# Patient Record
Sex: MALE | Race: WHITE | NOT HISPANIC OR LATINO | URBAN - METROPOLITAN AREA
[De-identification: names, ages, dates, MRNs, and addresses within clinical notes are randomized per-mention and may not be internally consistent; named-entity substitution may affect disease eponyms.]

---

## 2017-05-06 ENCOUNTER — INPATIENT (INPATIENT)
Facility: HOSPITAL | Age: 52
LOS: 0 days | Discharge: HOME | End: 2017-05-07

## 2017-06-28 DIAGNOSIS — R42 DIZZINESS AND GIDDINESS: ICD-10-CM

## 2017-06-28 DIAGNOSIS — R11.0 NAUSEA: ICD-10-CM

## 2017-06-28 DIAGNOSIS — R07.9 CHEST PAIN, UNSPECIFIED: ICD-10-CM

## 2017-06-28 DIAGNOSIS — R94.39 ABNORMAL RESULT OF OTHER CARDIOVASCULAR FUNCTION STUDY: ICD-10-CM

## 2020-02-09 ENCOUNTER — EMERGENCY (EMERGENCY)
Facility: HOSPITAL | Age: 55
LOS: 1 days | Discharge: HOME | End: 2020-02-09
Admitting: EMERGENCY MEDICINE
Payer: OTHER MISCELLANEOUS

## 2020-02-09 VITALS
RESPIRATION RATE: 18 BRPM | TEMPERATURE: 98 F | WEIGHT: 182.1 LBS | SYSTOLIC BLOOD PRESSURE: 165 MMHG | DIASTOLIC BLOOD PRESSURE: 102 MMHG | OXYGEN SATURATION: 95 % | HEART RATE: 65 BPM | HEIGHT: 67 IN

## 2020-02-09 PROCEDURE — 73090 X-RAY EXAM OF FOREARM: CPT | Mod: 26,RT

## 2020-02-09 PROCEDURE — 73110 X-RAY EXAM OF WRIST: CPT | Mod: 26,RT

## 2020-02-09 PROCEDURE — 99284 EMERGENCY DEPT VISIT MOD MDM: CPT | Mod: 25,57

## 2020-02-09 PROCEDURE — 25600 CLTX DST RDL FX/EPHYS SEP WO: CPT | Mod: 54

## 2020-02-09 NOTE — ED PROVIDER NOTE - MUSCULOSKELETAL, MLM
+TTP along distal, dorsal aspect of right forearm. no focal bony tenderness, obvious deformities or palpable crepitus. Painful ROM. Compartments soft. Distal NV status intact. +TTP along distal, radial aspect of right forearm and wrist, no obvious deformities or palpable crepitus. No snuffbox tenderness. Painful ROM. Compartments soft. Distal NV status intact.

## 2020-02-09 NOTE — ED PROVIDER NOTE - CLINICAL SUMMARY MEDICAL DECISION MAKING FREE TEXT BOX
Xrays reveal fracture of distal right radius. Pt placed in reverse sugar tong splint without complication. NV status intact. RICE and Ortho F/U in 1-3 days for further management. Strict return precautions reviewed with pt in which pt verbalizes understanding and agrees to.

## 2020-02-09 NOTE — ED PROVIDER NOTE - OBJECTIVE STATEMENT
55 y/o M presents to the ED c/o pain to his right wrist and forearm after tripping and falling onto his outstretched right hand while at work. [place] [employer] and [place] walking along the tracks when he tripped and fell forward. Pain is moderate in nature and is worse with movement of the right wrist and forearm. He has not taken any medication for the pain prior to arrival and states he does not want any pain medication at this time. No other associated injuries. no head strike. No numbness or weakness.

## 2020-02-09 NOTE — ED PROVIDER NOTE - CARE PROVIDER_API CALL
Kyrie Brown)  Orthopaedic Surgery  [address] [place] [address]  Phone: [phone]  Fax: [phone]  Follow Up Time: 1-3 Days

## 2020-02-09 NOTE — ED ADULT NURSE NOTE - CHIEF COMPLAINT QUOTE
pt. reports falling off his truck while working and landing on his right forearm. Pt. complaining of pain to arm and wrist, no deformity noted, small abrasion to the wrist. Denies hitting his head

## 2020-02-09 NOTE — ED ADULT TRIAGE NOTE - WEIGHT IN LBS
Preventive Care at the 11 - 14 Year Visit    Growth Percentiles & Measurements   Weight: 0 lbs 0 oz / Patient weight not available. / No weight on file for this encounter.  Length: Data Unavailable / 0 cm No height on file for this encounter.   BMI: There is no height or weight on file to calculate BMI. No height and weight on file for this encounter.     Next Visit    Continue to see your health care provider every year for preventive care.    Nutrition    It s very important to eat breakfast. This will help you make it through the morning.    Sit down with your family for a meal on a regular basis.    Eat healthy meals and snacks, including fruits and vegetables. Avoid salty and sugary snack foods.    Be sure to eat foods that are high in calcium and iron.    Avoid or limit caffeine (often found in soda pop).    Sleeping    Your body needs about 9 hours of sleep each night.    Keep screens (TV, computer, and video) out of the bedroom / sleeping area.  They can lead to poor sleep habits and increased obesity.    Health    Limit TV, computer and video time to one to two hours per day.    Set a goal to be physically fit.  Do some form of exercise every day.  It can be an active sport like skating, running, swimming, team sports, etc.    Try to get 30 to 60 minutes of exercise at least three times a week.    Make healthy choices: don t smoke or drink alcohol; don t use drugs.    In your teen years, you can expect . . .    To develop or strengthen hobbies.    To build strong friendships.    To be more responsible for yourself and your actions.    To be more independent.    To use words that best express your thoughts and feelings.    To develop self-confidence and a sense of self.    To see big differences in how you and your friends grow and develop.    To have body odor from perspiration (sweating).  Use underarm deodorant each day.    To have some acne, sometimes or all the time.  (Talk with your doctor or nurse  about this.)    Girls will usually begin puberty about two years before boys.  o Girls will develop breasts and pubic hair. They will also start their menstrual periods.  o Boys will develop a larger penis and testicles, as well as pubic hair. Their voices will change, and they ll start to have  wet dreams.     Sexuality    It is normal to have sexual feelings.    Find a supportive person who can answer questions about puberty, sexual development, sex, abstinence (choosing not to have sex), sexually transmitted diseases (STDs) and birth control.    Think about how you can say no to sex.    Safety    Accidents are the greatest threat to your health and life.    Always wear a seat belt in the car.    Practice a fire escape plan at home.  Check smoke detector batteries twice a year.    Keep electric items (like blow dryers, razors, curling irons, etc.) away from water.    Wear a helmet and other protective gear when bike riding, skating, skateboarding, etc.    Use sunscreen to reduce your risk of skin cancer.    Learn first aid and CPR (cardiopulmonary resuscitation).    Avoid dangerous behaviors and situations.  For example, never get in a car if the  has been drinking or using drugs.    Avoid peers who try to pressure you into risky activities.    Learn skills to manage stress, anger and conflict.    Do not use or carry any kind of weapon.    Find a supportive person (teacher, parent, health provider, counselor) whom you can talk to when you feel sad, angry, lonely or like hurting yourself.    Find help if you are being abused physically or sexually, or if you fear being hurt by others.    As a teenager, you will be given more responsibility for your health and health care decisions.  While your parent or guardian still has an important role, you will likely start spending some time alone with your health care provider as you get older.  Some teen health issues are actually considered confidential, and are  protected by law.  Your health care team will discuss this and what it means with you.  Our goal is for you to become comfortable and confident caring for your own health.  ==============================================================   182.1

## 2020-02-09 NOTE — ED PROVIDER NOTE - PATIENT PORTAL LINK FT
You can access the FollowMyHealth Patient Portal offered by [facility] by registering at the following website: [email or web address]. By joining [facility]’s FollowMyHealth portal, you will also be able to view your health information using other applications (apps) compatible with our system.
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
## 2020-02-15 DIAGNOSIS — S52.501A UNSPECIFIED FRACTURE OF THE LOWER END OF RIGHT RADIUS, INITIAL ENCOUNTER FOR CLOSED FRACTURE: ICD-10-CM
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
## 2020-02-15 DIAGNOSIS — M25.531 PAIN IN RIGHT WRIST: ICD-10-CM

## 2022-04-24 NOTE — ED ADULT TRIAGE NOTE - CHIEF COMPLAINT QUOTE
pt. reports falling off his truck while working and landing on his right forearm. Pt. complaining of pain to arm and wrist, no deformity noted, small abrasion to the wrist. Denies hitting his head
tachycardic

## 2025-01-30 NOTE — ED ADULT TRIAGE NOTE - NS ED NURSE DIRECT TO ROOM YN
Bed: B06  Expected date:   Expected time:   Means of arrival: [facility] [employer] (3)  Comments:  53M  difficulty breathing hx emphysema 78% RA now 96% after duoneb and 16DEX. +wheezing. A/O4 138/80 101 20    No